# Patient Record
Sex: FEMALE | Race: BLACK OR AFRICAN AMERICAN | NOT HISPANIC OR LATINO | URBAN - METROPOLITAN AREA
[De-identification: names, ages, dates, MRNs, and addresses within clinical notes are randomized per-mention and may not be internally consistent; named-entity substitution may affect disease eponyms.]

---

## 2024-02-21 ENCOUNTER — TELEPHONE (OUTPATIENT)
Dept: OBSTETRICS AND GYNECOLOGY | Age: 19
End: 2024-02-21
Payer: COMMERCIAL

## 2024-02-21 ENCOUNTER — OFFICE VISIT (OUTPATIENT)
Dept: OBSTETRICS AND GYNECOLOGY | Age: 19
End: 2024-02-21
Payer: COMMERCIAL

## 2024-02-21 VITALS
DIASTOLIC BLOOD PRESSURE: 72 MMHG | WEIGHT: 212 LBS | BODY MASS INDEX: 33.27 KG/M2 | SYSTOLIC BLOOD PRESSURE: 134 MMHG | HEIGHT: 67 IN

## 2024-02-21 DIAGNOSIS — Z13.9 SPECIAL SCREENING: ICD-10-CM

## 2024-02-21 DIAGNOSIS — Z31.69 ENCOUNTER FOR PRECONCEPTION CONSULTATION: ICD-10-CM

## 2024-02-21 DIAGNOSIS — E66.9 OBESITY (BMI 30-39.9): ICD-10-CM

## 2024-02-21 DIAGNOSIS — N91.2 AMENORRHEA: Primary | ICD-10-CM

## 2024-02-21 LAB
B-HCG UR QL: NEGATIVE
EXPIRATION DATE: NORMAL
INTERNAL NEGATIVE CONTROL: NEGATIVE
INTERNAL POSITIVE CONTROL: POSITIVE
Lab: NORMAL

## 2024-02-21 NOTE — PROGRESS NOTES
"Subjective     Chief Complaint   Patient presents with    Possible Pregnancy     Pt states she has not had a menses since the beginning of November        Emiliano Deleon is a 18 y.o.  whose LMP is Patient's last menstrual period was 2023 (exact date). presents with amenorrhea    Tatiana  is interpreting   Periods have been regular every 28 days lasting 3 days  Until November   LMP 11/3/23-23  Took a PT at home that was negative in nov, dec, January   Feeling nausea, headache and some bloating    They are trying for pregnancy  Has had cramping but no menses  No new medications       The following portions of the patient's history were reviewed and updated as appropriate:vital signs, allergies, current medications, past medical history, past social history, past surgical history, and problem list      Review of Systems   Pertinent items are noted in HPI.     Objective      /72   Ht 170.2 cm (67\")   Wt 96.2 kg (212 lb)   LMP 2023 (Exact Date)   BMI 33.20 kg/m²     Physical Exam    General:   alert   Heart: Not performed today   Lungs: Not performed today.   Breast: Not performed today   Neck: Not performed today   Abdomen: Not performed today   CVA: Not performed today   Pelvis: Not performed today   Extremities: Not performed today   Neurologic: AOx3. Gait normal. Reflexes and motor strength normal and symmetric. Cranial nerves 2-12 and sensation grossly intact.   Psychiatric: Normal affect, judgement, and mood       Lab Review   Labs: No data reviewed    Imaging   Ultrasound - Pelvic Vaginal    Assessment & Plan     ASSESSMENT  1. Amenorrhea    2. Special screening    3. Encounter for preconception consultation    4. Obesity (BMI 30-39.9)      Impression:     1.  Uterus: Normal size and Anteverted     2.  Endometrium: Normal non-menopausal thickness and 7.72 mm      3.  Myometrium: Normal homogenous texture      4.  Ovaries             Left: Polycystic appearance             " Right: Polycystic appearance     No FF or mass noted       PLAN  1.   Orders Placed This Encounter   Procedures    HCG, B-subunit, Quantitative ([facility] Only)    DHEA-Sulfate    Prolactin    Lipid Panel    Hemoglobin A1c    Testosterone Free Direct    Estradiol    Follicle Stimulating Hormone    TSH Rfx On Abnormal To Free T4    CBC (No Diff)    Antimullerian Hormone ([facility])    POC Pregnancy, Urine       2. Medications prescribed this encounter:      No orders of the defined types were placed in this encounter.      3. Will check labs  Discussed wellness visit paps start at 19  Will call with lab results he desires labs to determine fertility    would also like sa paperwork given  Provera was discussed I will call them  TVUS normal today no IUP suspect PCOS   PT today is negative as well  I spent 30 minutes caring for Emiliano Deleon on this date of service. This time includes time spent by me in the following activities: preparing for the visit, reviewing tests, obtaining and/or reviewing a separately obtained history, performing a medically appropriate examination and/or evaluation, counseling and educating the patient/family/caregiver, ordering medications, tests, or procedures and documenting information in the medical record.    Follow up: 3 months    Eileen Marinelli, APRN  2/21/2024

## 2024-03-19 ENCOUNTER — TELEPHONE (OUTPATIENT)
Dept: OBSTETRICS AND GYNECOLOGY | Age: 19
End: 2024-03-19

## 2024-03-19 DIAGNOSIS — N91.2 AMENORRHEA: Primary | ICD-10-CM

## 2024-03-19 RX ORDER — PROGESTERONE 200 MG/1
200 CAPSULE ORAL DAILY
Qty: 10 CAPSULE | Refills: 0 | Status: SHIPPED | OUTPATIENT
Start: 2024-03-19 | End: 2024-03-29

## 2024-03-19 NOTE — TELEPHONE ENCOUNTER
Caller: MONTSE WALKER    Relationship to patient: Spouse    Best call back number: [phone]    CALL BACK TO INQUIRE ABOUT TEST RESULTS.     IS ON [facility] VERBAL.

## 2024-05-24 ENCOUNTER — OFFICE VISIT (OUTPATIENT)
Dept: OBSTETRICS AND GYNECOLOGY | Age: 19
End: 2024-05-24
Payer: COMMERCIAL

## 2024-05-24 VITALS
BODY MASS INDEX: 32.96 KG/M2 | WEIGHT: 210 LBS | DIASTOLIC BLOOD PRESSURE: 84 MMHG | HEIGHT: 67 IN | SYSTOLIC BLOOD PRESSURE: 126 MMHG

## 2024-05-24 DIAGNOSIS — E28.2 PCOS (POLYCYSTIC OVARIAN SYNDROME): ICD-10-CM

## 2024-05-24 DIAGNOSIS — N91.2 AMENORRHEA: ICD-10-CM

## 2024-05-24 DIAGNOSIS — Z31.69 ENCOUNTER FOR PRECONCEPTION CONSULTATION: Primary | ICD-10-CM

## 2024-05-24 DIAGNOSIS — L68.0 FEMALE HIRSUTISM: ICD-10-CM

## 2024-05-24 RX ORDER — PNV NO.95/FERROUS FUM/FOLIC AC 28MG-0.8MG
1 TABLET ORAL DAILY
Qty: 30 TABLET | Refills: 12 | Status: SHIPPED | OUTPATIENT
Start: 2024-05-24

## 2024-05-24 NOTE — PROGRESS NOTES
"Subjective     Chief Complaint   Patient presents with    Follow-up     3 month f/u amenorrhea        Emiliano Deleon is a 18 y.o.  whose LMP is Patient's last menstrual period was 05/10/2024 (exact date). presents with follow up on amenorrhea  She was seen by me in February at that time she had not had a cycle since November  Previous menstrual history was normal monthly cycles  she did not complete labs  She called the office and wanted to start provera after negative PT   After staring provera she had a bleed on 3/31/24 that lasted 6 days   Got a period on her own 5/10/24 lasted 3 days had cramping with this heavy on day 1 and then light    has not completed SA yet   They are trying to conceive   Did not start prenatal vitamin   She reports some wt gain >15lb        The following portions of the patient's history were reviewed and updated as appropriate:vital signs, allergies, current medications, past medical history, past social history, past surgical history, and problem list      Review of Systems   Pertinent items are noted in HPI.     Objective      /84   Ht 170.2 cm (67\")   Wt 95.3 kg (210 lb)   LMP 05/10/2024 (Exact Date)   BMI 32.89 kg/m²     Physical Exam    General:   alert   Heart: Not performed today   Lungs: Not performed today.   Breast: Not performed today   Neck: Not performed today   Abdomen: Not performed today   CVA: Not performed today   Pelvis: Not performed today   Extremities: Extremities normal, atraumatic, no cyanosis or edema   Neurologic: AOx3. Gait normal. Reflexes and motor strength normal and symmetric. Cranial nerves 2-12 and sensation grossly intact.   Psychiatric: Normal affect, judgement, and mood       Lab Review   Labs: No data reviewed    Imaging   US Non-ob Transvaginal (2024 13:41)     Assessment & Plan     ASSESSMENT  1. Encounter for preconception consultation    2. Amenorrhea    3. Female hirsutism    4. PCOS (polycystic ovarian syndrome)  "         PLAN  1.   Orders Placed This Encounter   Procedures    Estradiol    FSH & LH    TSH Rfx On Abnormal To Free T4    Prolactin    Testosterone - total    Hemoglobin A1c    Lipid Panel       2. Medications prescribed this encounter:        New Medications Ordered This Visit   Medications    Prenatal Vit-Fe Fumarate-FA (Prenatal Vitamin) 27-0.8 MG tablet     Sig: Take 1 tablet by mouth Daily.     Dispense:  30 tablet     Refill:  12       3. We discussed her irregular cycles  Reviewed her previous US with  PCOS appearing ovaries  Will check some labs today and correct as needed  Its promising she had a cycle on her own  She will start a pnv  Track ovulation and let me know if she has any positive results  If no cycle within 3 months needs to restart a progesterone will use Prometrium next time  Work on diet and exercise   planning to complete SA however we need to address her anovulation first   I spent 30 minutes caring for Emiliano Deleon on this date of service. This time includes time spent by me in the following activities: preparing for the visit, reviewing tests, obtaining and/or reviewing a separately obtained history, performing a medically appropriate examination and/or evaluation, counseling and educating the patient/family/caregiver, ordering medications, tests, or procedures and documenting information in the medical record.  Follow up: 3 month(s)    Eileen Marinelli, APRN  5/24/2024

## 2024-05-25 LAB
CHOLEST SERPL-MCNC: 148 MG/DL (ref 0–200)
ESTRADIOL SERPL-MCNC: 42.6 PG/ML
FSH SERPL-ACNC: 6.9 MIU/ML
HBA1C MFR BLD: 5.3 % (ref 4.8–5.6)
HDLC SERPL-MCNC: 42 MG/DL (ref 40–60)
LDLC SERPL CALC-MCNC: 79 MG/DL (ref 0–100)
LH SERPL-ACNC: 17.5 MIU/ML
PROLACTIN SERPL-MCNC: 24.8 NG/ML (ref 4.8–33.4)
TESTOST SERPL-MCNC: 32 NG/DL (ref 13–71)
TRIGL SERPL-MCNC: 155 MG/DL (ref 0–150)
TSH SERPL DL<=0.005 MIU/L-ACNC: 1.42 UIU/ML (ref 0.27–4.2)
VLDLC SERPL CALC-MCNC: 27 MG/DL (ref 5–40)

## 2024-09-17 ENCOUNTER — TELEPHONE (OUTPATIENT)
Dept: OBSTETRICS AND GYNECOLOGY | Age: 19
End: 2024-09-17

## 2024-09-18 RX ORDER — PROGESTERONE 200 MG/1
200 CAPSULE ORAL DAILY
Qty: 10 CAPSULE | Refills: 4 | Status: SHIPPED | OUTPATIENT
Start: 2024-09-18 | End: 2024-09-28

## 2024-12-09 ENCOUNTER — TELEPHONE (OUTPATIENT)
Dept: OBSTETRICS AND GYNECOLOGY | Age: 19
End: 2024-12-09

## 2024-12-09 NOTE — TELEPHONE ENCOUNTER
Caller: MONTSE WALKER    Relationship to patient:     Best call back number: [phone] () IF NO ANSWER THEN PT CAN BE REACHED DAILY AFTER 3PM  [phone]    Patient is needing: PTS  CALLED IN TO SCHEDULE APPT WITH FLOYD DEE. HIS WIFE SEEN HER 05/24/24 AND WAS TO RETURN TO THE OFFICE IN THREE MONTHS. THEY ARE TRYING TO CONCEIVE. SHE HAS HAD ONE PERIOD SINCE THE LAST VISIT. HE THINKS IT WAS AROUND JULY.

## 2024-12-09 NOTE — TELEPHONE ENCOUNTER
Attempted to contact patient , number was not in service. Attempted to contact patient , no answer, left voicemail notifying patient deena Marinelli has moved to a different location and if she would like to proceed to transfer to this location give us a call back at [phone] to schedule.     Kamran Interpretor used ID # [id number]

## 2025-01-14 ENCOUNTER — OFFICE VISIT (OUTPATIENT)
Dept: OBSTETRICS AND GYNECOLOGY | Age: 20
End: 2025-01-14
Payer: COMMERCIAL

## 2025-01-14 VITALS
DIASTOLIC BLOOD PRESSURE: 64 MMHG | SYSTOLIC BLOOD PRESSURE: 112 MMHG | WEIGHT: 204 LBS | HEIGHT: 67 IN | BODY MASS INDEX: 32.02 KG/M2

## 2025-01-14 DIAGNOSIS — N92.6 IRREGULAR MENSES: Primary | ICD-10-CM

## 2025-01-14 DIAGNOSIS — E28.2 PCOS (POLYCYSTIC OVARIAN SYNDROME): ICD-10-CM

## 2025-01-14 PROCEDURE — 99213 OFFICE O/P EST LOW 20 MIN: CPT | Performed by: PHYSICIAN ASSISTANT

## 2025-01-14 RX ORDER — PROGESTERONE 200 MG/1
200 CAPSULE ORAL DAILY
Qty: 10 CAPSULE | Refills: 3 | Status: SHIPPED | OUTPATIENT
Start: 2025-01-14

## 2025-01-14 NOTE — PROGRESS NOTES
"Subjective     Chief Complaint   Patient presents with    Follow-up     Follow up , c/o no period and negative hcg. Last period was 10/30/2024 when she took progesterone.        Emiliano Deleon is a 19 y.o.  whose LMP is No LMP recorded. presents with absence of menstruation    She was seen by SOHNA Abarca in May 2024  They discussed her irregular menses and performed some labs  She was diagnosed with PCOS based on her irregular menses, hirsutism and pelvic u/s  She did use Prometrium to elicit a period and has had withdrawal bleeding when she takes her medicine    Skipped a period in November  Used prometrium and was able to have a period in Dec  Has a short period, 3 days  Is not having a period unless she uses the Prometrium  Previously she was having regular menses     The changes occurred in 2023 when she was under a lot of stress and gained weight    She is trying for pregnancy  She is drinking tea and using cinnamon which helps with nausea  Was also working on weight loss    Her  did have an SA done and was told it was abnormal  He is waiting for an appt for further w/u    She speaks swahili and an  was used for the visit    No Additional Complaints Reported    The following portions of the patient's history were reviewed and updated as appropriate:no additional history reviewed, vital signs, allergies, current medications, past medical history, past social history, past surgical history, and problem list      Review of Systems   Genitourinary:positive for abnormal menstrual periods     Objective      /64   Ht 170.2 cm (67\")   Wt 92.5 kg (204 lb)   BMI 31.95 kg/m²     Physical Exam    General:   alert, comfortable, and no distress   Heart: Not performed today   Lungs: Not performed today.   Breast: Not performed today   Neck: Not performed today   Abdomen: Not performed today   CVA: Not performed today   Pelvis: Not performed today   Extremities: Not performed today "   Neurologic: negative   Psychiatric: Normal affect, judgement, and mood       Lab Review   Labs: reviewed labs    Imaging   Ultrasound - Pelvic Vaginal  US Non-ob Transvaginal (02/21/2024 13:41)   Assessment & Plan     ASSESSMENT  1. Irregular menses    2. PCOS (polycystic ovarian syndrome)          PLAN  1. Disc pcos. HO given on diet to follow. Work on diet and exercise. C/w taking PNV. Check UPT prior to taking Prometrium. Can start letrazole once her husbands SA is wnl. Can consider metformin if diet changes and exercise do not help with ovulation/regular menses    2. Medications prescribed this encounter:        New Medications Ordered This Visit   Medications    Progesterone (Prometrium) 200 MG capsule     Sig: Take 1 capsule by mouth Daily.     Dispense:  10 capsule     Refill:  3           Follow up: 3 month(s) for annual exam     DAMON Loyola  1/14/2025

## 2025-01-29 ENCOUNTER — TELEPHONE (OUTPATIENT)
Dept: OBSTETRICS AND GYNECOLOGY | Age: 20
End: 2025-01-29
Payer: COMMERCIAL

## 2025-01-29 NOTE — TELEPHONE ENCOUNTER
Patient  is calling and states wife was told to call once she starts her period to be prescribed a medication that could help her.  states you would know more about what was going on given instructions given to patient at last appt.

## 2025-01-30 RX ORDER — LETROZOLE 2.5 MG/1
TABLET, FILM COATED ORAL
Qty: 5 TABLET | Refills: 2 | Status: SHIPPED | OUTPATIENT
Start: 2025-01-30

## 2025-03-14 NOTE — TELEPHONE ENCOUNTER
Caller: Emiliano Deleon    Relationship: Self    Best call back number: [phone]    Requested Prescriptions:   Requested Prescriptions      No prescriptions requested or ordered in this encounter      FEMARA (PT SAYS SHE WANTS THIS TO BE 7MG)    PROGESTERONE (PROMETRIUM) 200 MG CAPSULE    Pharmacy where request should be sent:  [facility]     Last office visit with prescribing clinician: 1/14/2025   Last telemedicine visit with prescribing clinician: Visit date not found   Next office visit with prescribing clinician: 4/16/2025     Additional details provided by patient: PT WANTS 7MG OF FEMARA    Does the patient have less than a 3 day supply:  [x] Yes  [] No    Would you like a call back once the refill request has been completed: [x] Yes [] No    If the office needs to give you a call back, can they leave a voicemail: [x] Yes [] No    Maeve Win Rep   03/14/25 09:56 EDT

## 2025-03-19 RX ORDER — PROGESTERONE 200 MG/1
200 CAPSULE ORAL DAILY
Qty: 10 CAPSULE | Refills: 3 | OUTPATIENT
Start: 2025-03-19

## 2025-03-19 RX ORDER — LETROZOLE 2.5 MG/1
TABLET, FILM COATED ORAL
Qty: 5 TABLET | Refills: 2 | OUTPATIENT
Start: 2025-03-19

## 2025-04-16 ENCOUNTER — OFFICE VISIT (OUTPATIENT)
Dept: OBSTETRICS AND GYNECOLOGY | Age: 20
End: 2025-04-16
Payer: COMMERCIAL

## 2025-04-16 VITALS
HEIGHT: 67 IN | WEIGHT: 206 LBS | SYSTOLIC BLOOD PRESSURE: 118 MMHG | DIASTOLIC BLOOD PRESSURE: 80 MMHG | BODY MASS INDEX: 32.33 KG/M2

## 2025-04-16 DIAGNOSIS — Z01.419 WELL WOMAN EXAM WITH ROUTINE GYNECOLOGICAL EXAM: Primary | ICD-10-CM

## 2025-04-16 DIAGNOSIS — Z12.4 ENCOUNTER FOR PAPANICOLAOU SMEAR FOR CERVICAL CANCER SCREENING: ICD-10-CM

## 2025-04-16 DIAGNOSIS — Z11.3 SCREEN FOR STD (SEXUALLY TRANSMITTED DISEASE): ICD-10-CM

## 2025-04-16 DIAGNOSIS — N92.6 IRREGULAR MENSES: ICD-10-CM

## 2025-04-16 NOTE — PROGRESS NOTES
Subjective     Chief Complaint   Patient presents with    Annual Exam     Annual exam : pt said she is here for a follow up and she said that the cream that was given her discharge is better but not gone.   Never had pap        History of Present Illness    Emiliano Deleon is a 19 y.o.  who presents for annual exam.    Emiliano is here for her annual exam  She has never had a pap    I saw her in January  She is  and trying for pregnancy but has h/o irregular menses/PCOS  We ordered a SA and I have reviewed those results  Motility is wnl, morphology is moderate. Concentration is low  He dropped off another specimen today    I did prescribe progesterone and letrozole and had suggested waiting to start it until we knew the SA results but she has used it for 2 months so far  She did note a d/c during ovulation but did not spontaneously have a period  She did a home upt that was negative and then took the progesterone again    She is taking a PNV       Her menses are irregular, lasting 0-3 days, dysmenorrhea none   Obstetric History:  OB History          0    Para   0    Term   0       0    AB   0    Living   0         SAB   0    IAB   0    Ectopic   0    Molar   0    Multiple   0    Live Births   0               Menstrual History:     Patient's last menstrual period was 2025 (exact date).         Current contraception: none  History of abnormal Pap smear:  never had  Received Gardasil immunization: no  Perform regular self breast exam: no  Family history of uterine or ovarian cancer: no  Family History of colon cancer: no  Family history of breast cancer: no    Mammogram: not indicated.  Colonoscopy: not indicated.  DEXA: not indicated.    Exercise: not active  Calcium/Vitamin D: adequate intake    The following portions of the patient's history were reviewed and updated as appropriate: allergies, current medications, past family history, past medical history, past social history, past  "surgical history, and problem list.    Review of Systems    Review of Systems   Constitutional: Negative for fatigue.   Respiratory: Negative for shortness of breath.    Gastrointestinal: Negative for abdominal pain.   Genitourinary: Negative for dysuria.   Neurological: Negative for headaches.   Psychiatric/Behavioral: Negative for dysphoric mood.         Objective   Physical Exam    /80   Ht 170.2 cm (67\")   Wt 93.4 kg (206 lb)   LMP 04/03/2025 (Exact Date)   BMI 32.26 kg/m²   General:   alert, comfortable, and no distress   Heart: regular rate and rhythm   Lungs: clear to auscultation bilaterally   Breast: normal appearance, no masses or tenderness, Inspection negative, No nipple retraction or dimpling, No nipple discharge or bleeding, No axillary or supraclavicular adenopathy, Normal to palpation without dominant masses   Neck: no adenopathy and no carotid bruit   Abdomen: Not performed today   CVA: Not performed today   Pelvis: External genitalia: normal general appearance  Vaginal: normal mucosa without prolapse or lesions  Cervix: normal appearance, thin prep PAP obtained, and GC prep obtained  Adnexa: normal bimanual exam  Uterus: normal single, nontender   Extremities: Not performed today   Neurologic: negative   Psychiatric: Normal affect, judgement, and mood     Assessment & Plan   Diagnoses and all orders for this visit:    1. Well woman exam with routine gynecological exam (Primary)    2. Encounter for Papanicolaou smear for cervical cancer screening  -     IGP, CtNg, Rfx Aptima HPV ASCU    3. Screen for STD (sexually transmitted disease)  -     IGP, CtNg, Rfx Aptima HPV ASCU    4. Irregular menses  -     HCG, B-subunit, Quantitative        All questions answered.  Breast self exam technique reviewed and patient encouraged to perform self-exam monthly.  Discussed healthy lifestyle modifications.  Recommended 30 minutes of aerobic exercise five times per week.  Discussed calcium needs to " prevent osteoporosis.      Pap and std testing done  Hcg ordered  Disc ovulation induction. She is noting +OPK but still not having a spontaneous menses. Will c/w current plan and await repeat SA. Plan f/u with Dr Jeronimo in 3 months

## 2025-04-17 LAB — HCG INTACT+B SERPL-ACNC: <1 MIU/ML

## 2025-04-19 LAB
C TRACH RRNA CVX QL NAA+PROBE: NEGATIVE
CONV .: NORMAL
CYTOLOGIST CVX/VAG CYTO: NORMAL
CYTOLOGY CVX/VAG DOC CYTO: NORMAL
CYTOLOGY CVX/VAG DOC THIN PREP: NORMAL
DX ICD CODE: NORMAL
N GONORRHOEA RRNA CVX QL NAA+PROBE: NEGATIVE
OTHER STN SPEC: NORMAL
SERVICE CMNT-IMP: NORMAL
STAT OF ADQ CVX/VAG CYTO-IMP: NORMAL

## 2025-06-19 ENCOUNTER — TELEPHONE (OUTPATIENT)
Dept: OBSTETRICS AND GYNECOLOGY | Age: 20
End: 2025-06-19
Payer: COMMERCIAL

## 2025-06-19 RX ORDER — PROGESTERONE 200 MG/1
200 CAPSULE ORAL DAILY
Qty: 10 CAPSULE | Refills: 3 | Status: SHIPPED | OUTPATIENT
Start: 2025-06-19

## 2025-06-25 ENCOUNTER — TELEPHONE (OUTPATIENT)
Dept: OBSTETRICS AND GYNECOLOGY | Age: 20
End: 2025-06-25
Payer: COMMERCIAL

## 2025-06-25 NOTE — TELEPHONE ENCOUNTER
Pt's spouse calling stating pt is needing a refill on letrozole Rx. Pt last seen in office 4/16/25. Pharmacy on file. Please advise.

## 2025-06-26 DIAGNOSIS — E28.2 PCOS (POLYCYSTIC OVARIAN SYNDROME): Primary | ICD-10-CM

## 2025-06-26 RX ORDER — LETROZOLE 2.5 MG/1
TABLET, FILM COATED ORAL
Qty: 5 TABLET | Refills: 2 | Status: SHIPPED | OUTPATIENT
Start: 2025-06-26

## 2025-06-26 NOTE — TELEPHONE ENCOUNTER
CHRISTINE for patient and spouse. I faxed all records to Dr. Jose F Espinoza's office with [facility]. Our office is not able to make an appointment on their behalf. They have to call Dr. Jose F Espinoza's office by dialing [phone] and let them know a referral was faxed by Janis Knowles's office.     Carly

## 2025-06-26 NOTE — TELEPHONE ENCOUNTER
Spoke with spouse. He stated he believes she was ovulating on current dose but was not 100% sure. He stated he prefers it be increased but if need to know [place] I can call back around 3:00 to speak with the pt. He did state they would like to go ahead & be referred to fertility specialist at this time.

## 2025-06-26 NOTE — TELEPHONE ENCOUNTER
Spoke with pt's spouse. Pt's spouse stating he has completed 2 semen analysis & that pt has been checking ovulation predictor kits. Pt's spouse stating they need this Rx as soon as possible. I advised Nereida is currently out of town & message may have to wait for her. Pt's spouse stated they are frustrated & feeling like they aren't getting many answers. Pt aware they have an appt with Dr. Jeronimo next month. Please advise if Rx can be sent.